# Patient Record
Sex: FEMALE | Race: WHITE | NOT HISPANIC OR LATINO | URBAN - METROPOLITAN AREA
[De-identification: names, ages, dates, MRNs, and addresses within clinical notes are randomized per-mention and may not be internally consistent; named-entity substitution may affect disease eponyms.]

---

## 2023-05-09 ENCOUNTER — WEB ENCOUNTER (OUTPATIENT)
Dept: URBAN - METROPOLITAN AREA CLINIC 19 | Facility: CLINIC | Age: 63
End: 2023-05-09

## 2023-05-15 ENCOUNTER — OFFICE VISIT (OUTPATIENT)
Dept: URBAN - METROPOLITAN AREA CLINIC 19 | Facility: CLINIC | Age: 63
End: 2023-05-15
Payer: COMMERCIAL

## 2023-05-15 ENCOUNTER — WEB ENCOUNTER (OUTPATIENT)
Dept: URBAN - METROPOLITAN AREA CLINIC 19 | Facility: CLINIC | Age: 63
End: 2023-05-15

## 2023-05-15 ENCOUNTER — DASHBOARD ENCOUNTERS (OUTPATIENT)
Age: 63
End: 2023-05-15

## 2023-05-15 VITALS
BODY MASS INDEX: 43.97 KG/M2 | WEIGHT: 273.6 LBS | DIASTOLIC BLOOD PRESSURE: 79 MMHG | TEMPERATURE: 98.2 F | SYSTOLIC BLOOD PRESSURE: 117 MMHG | HEIGHT: 66 IN

## 2023-05-15 DIAGNOSIS — K21.9 GERD WITHOUT ESOPHAGITIS: ICD-10-CM

## 2023-05-15 DIAGNOSIS — Z86.010 HISTORY OF COLON POLYPS: ICD-10-CM

## 2023-05-15 DIAGNOSIS — M62.89 PELVIC FLOOR DYSFUNCTION: ICD-10-CM

## 2023-05-15 DIAGNOSIS — R11.0 NAUSEA: ICD-10-CM

## 2023-05-15 PROBLEM — 428283002: Status: ACTIVE | Noted: 2023-05-15

## 2023-05-15 PROBLEM — 266435005: Status: ACTIVE | Noted: 2023-05-15

## 2023-05-15 PROCEDURE — 99204 OFFICE O/P NEW MOD 45 MIN: CPT | Performed by: INTERNAL MEDICINE

## 2023-05-15 RX ORDER — ESTRADIOL 2 MG/1
TABLET ORAL
Qty: 0 | Refills: 0 | COMMUNITY
Start: 1900-01-01

## 2023-05-15 NOTE — HPI-TODAY'S VISIT:
Josefina is new patient self referral for GERD and history of colon polyps. She has had long standing GERD and had been well controlled on Omeprazole.  She was started on Ozempic 6 weeks ago and she just started the higher dose and was quite sick with nausea , vomiting, diarrhea and constipation.  She had to go to ER. She also reports irregular Bms with constipation switching to loose stools with abdominal discomfort as well.  Ozempic is on hold currently. When she vomited, it was undigested food from the day before.

## 2023-05-16 ENCOUNTER — OFFICE VISIT (OUTPATIENT)
Dept: URBAN - METROPOLITAN AREA CLINIC 19 | Facility: CLINIC | Age: 63
End: 2023-05-16

## 2023-05-31 ENCOUNTER — OFFICE VISIT (OUTPATIENT)
Dept: URBAN - METROPOLITAN AREA CLINIC 19 | Facility: CLINIC | Age: 63
End: 2023-05-31

## 2023-06-23 ENCOUNTER — CLAIMS CREATED FROM THE CLAIM WINDOW (OUTPATIENT)
Dept: URBAN - METROPOLITAN AREA MEDICAL CENTER 25 | Facility: MEDICAL CENTER | Age: 63
End: 2023-06-23
Payer: COMMERCIAL

## 2023-06-23 ENCOUNTER — CLAIMS CREATED FROM THE CLAIM WINDOW (OUTPATIENT)
Dept: URBAN - METROPOLITAN AREA MEDICAL CENTER 25 | Facility: MEDICAL CENTER | Age: 63
End: 2023-06-23

## 2023-06-23 DIAGNOSIS — K52.89 (LYMPHOCYTIC) MICROSCOPIC COLITIS: ICD-10-CM

## 2023-06-23 DIAGNOSIS — K22.89 DILATATION OF ESOPHAGUS: ICD-10-CM

## 2023-06-23 DIAGNOSIS — K29.80 ACUTE DUODENITIS: ICD-10-CM

## 2023-06-23 DIAGNOSIS — K63.5 BENIGN COLON POLYP: ICD-10-CM

## 2023-06-23 DIAGNOSIS — K29.60 ADENOPAPILLOMATOSIS GASTRICA: ICD-10-CM

## 2023-06-23 PROCEDURE — 45380 COLONOSCOPY AND BIOPSY: CPT | Performed by: INTERNAL MEDICINE

## 2023-06-23 PROCEDURE — 43239 EGD BIOPSY SINGLE/MULTIPLE: CPT | Performed by: INTERNAL MEDICINE

## 2023-07-27 ENCOUNTER — TELEPHONE ENCOUNTER (OUTPATIENT)
Dept: URBAN - METROPOLITAN AREA CLINIC 19 | Facility: CLINIC | Age: 63
End: 2023-07-27

## 2025-05-08 ENCOUNTER — OFFICE VISIT (OUTPATIENT)
Dept: URBAN - METROPOLITAN AREA CLINIC 19 | Facility: CLINIC | Age: 65
End: 2025-05-08
Payer: MEDICARE

## 2025-05-08 DIAGNOSIS — K31.89 GASTRIC INTESTINAL METAPLASIA: ICD-10-CM

## 2025-05-08 PROCEDURE — 99213 OFFICE O/P EST LOW 20 MIN: CPT | Performed by: NURSE PRACTITIONER

## 2025-05-08 RX ORDER — ESTRADIOL 2 MG/1
TABLET ORAL
Qty: 0 | Refills: 0 | Status: ACTIVE | COMMUNITY
Start: 1900-01-01

## 2025-05-08 RX ORDER — OMEPRAZOLE 20 MG/1
1 CAPSULE 1/2 TO 1 HOUR BEFORE MORNING MEAL CAPSULE, DELAYED RELEASE ORAL ONCE A DAY
Qty: 90 | Refills: 3 | OUTPATIENT
Start: 2025-05-08

## 2025-05-08 RX ORDER — FAMOTIDINE 20 MG/1
1 TABLET AT BEDTIME AS NEEDED TABLET, FILM COATED ORAL ONCE A DAY
Qty: 90 | Refills: 3 | OUTPATIENT
Start: 2025-05-08

## 2025-05-08 NOTE — HPI-TODAY'S VISIT:
5/15/2023 (Dr. Mcknight):   Josefina is new patient self referral for GERD and history of colon polyps. She has had long standing GERD and had been well controlled on Omeprazole. She was started on Ozempic 6 weeks ago and she just started the higher dose and was quite sick with nausea , vomiting, diarrhea and constipation.        She had to go to ER. She also reports irregular Bms with constipation switching to loose stools with abdominal discomfort as well.        Ozempic is on hold currently. When she vomited, it was undigested food from the day before.   6/23/2023 EGD/colonoscopy with Dr. Mcknight: EGD normal esophagus.  A single mucosal nodule found in the stomach.  Chronic nonerosive gastritis.  Normal duodenum.  Path: Duodenum mild increase in chronic inflammation within lamina propria with a prominent component of eosinophils 50/hpf.  Stomach random biopsy mild increase in chronic inflammation within lamina propria eosinophils up to 50/hpf.  Small focus of complete intestinal metaplasia in antrum mucosa; negative for H. pylori.  GE junction nodule with increased eosinophils within the squamous epithelium 30/hpf.  A few cystically dilated mucous glands; negative for intestinal metaplasia. Colonoscopy - fair prep diverticulosis 4 mm benign polyp at the hepatic flexure.  3-year follow-up given due to suboptimal prep. Random colon biopsies slight architectural irregularity and increased chronic inflammation with eosinophils 50/hpf.    5/8/2025 (Esther Ruvalcaba, GERA): She takes Prilosec OTC, some days will miss doses but unable to go more than 2-3 days without it. Does take Pepcid PRN. No dysphagia. No N/V. She reports hip more hip pain. She reports she has MS and possible Fibromyalgia. Denies NSAID use. She reports BMs have been good lately. States she retired June last year. Takes Colace PRN.